# Patient Record
Sex: MALE | Race: WHITE | NOT HISPANIC OR LATINO | Employment: OTHER | URBAN - METROPOLITAN AREA
[De-identification: names, ages, dates, MRNs, and addresses within clinical notes are randomized per-mention and may not be internally consistent; named-entity substitution may affect disease eponyms.]

---

## 2021-10-08 ENCOUNTER — OFFICE VISIT (OUTPATIENT)
Dept: OPHTHALMOLOGY | Facility: CLINIC | Age: 86
End: 2021-10-08
Payer: COMMERCIAL

## 2021-10-08 DIAGNOSIS — H52.4 PRESBYOPIA: ICD-10-CM

## 2021-10-08 DIAGNOSIS — Z01.01 ENCOUNTER FOR EXAMINATION OF EYES AND VISION WITH ABNORMAL FINDINGS: Primary | ICD-10-CM

## 2021-10-08 DIAGNOSIS — H40.053 BILATERAL OCULAR HYPERTENSION: ICD-10-CM

## 2021-10-08 DIAGNOSIS — H25.813 COMBINED FORMS OF AGE-RELATED CATARACT OF BOTH EYES: ICD-10-CM

## 2021-10-08 PROCEDURE — 92015 DETERMINE REFRACTIVE STATE: CPT | Performed by: OPHTHALMOLOGY

## 2021-10-08 PROCEDURE — 92004 COMPRE OPH EXAM NEW PT 1/>: CPT | Performed by: OPHTHALMOLOGY

## 2021-10-08 ASSESSMENT — REFRACTION_WEARINGRX
OS_SPHERE: +1.50
OS_CYLINDER: +2.50
SPECS_TYPE: PAL
OD_CYLINDER: +1.50
OD_AXIS: 020
OS_ADD: +3.00
OS_AXIS: 143
OD_SPHERE: +3.00
OD_ADD: +3.00

## 2021-10-08 ASSESSMENT — REFRACTION_MANIFEST
OD_CYLINDER: +1.50
OD_AXIS: 015
OS_CYLINDER: +2.50
OD_SPHERE: +2.50
OS_AXIS: 140
OS_SPHERE: +1.50

## 2021-10-08 ASSESSMENT — VISUAL ACUITY
CORRECTION_TYPE: GLASSES
OD_CC: 20/200
OS_CC+: +1
OS_CC: 20/30
METHOD: SNELLEN - LINEAR

## 2021-10-08 ASSESSMENT — TONOMETRY
OS_IOP_MMHG: 28
IOP_METHOD: ICARE
IOP_METHOD: APPLANATION
OS_IOP_MMHG: 26

## 2021-10-08 ASSESSMENT — CONF VISUAL FIELD
OS_NORMAL: 1
OD_INFERIOR_TEMPORAL_RESTRICTION: 3

## 2021-10-08 NOTE — PATIENT INSTRUCTIONS
Will obtain glaucoma OCT and retinal OCT today - I will contact you if any concerns.  We will contact you to schedule cataract surgery for your right eye sometime in the near future.  Danie Pate M.D.  [phone]

## 2021-10-08 NOTE — PROGRESS NOTES
Current Eye Medications:  None     Subjective:  Pt states he is here for cataract evaluation. Pt notes he has cloudy vision in his right eye that started getting worse in the last 5 months.  States left eye vision is good. Pt avoids night driving due to vision. Denies floaters and flashes.    Describes gradual decrease in vision right eye.  Last eye exam 1 year ago - no mention of elevated intraocular pressure.    RH.   GFT.  Tamsulosin.  Wife in assisted living.  L[place] son can drive.  PCP [place].      Objective:  See Ophthalmology Exam.       Assessment:  Visually significant cataract right eye > left eye.  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      ICD-10-CM    1. Encounter for examination of eyes and vision with abnormal findings  Z01.01    2. Presbyopia  H52.4    3. 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  We will contact you to schedule cataract surgery for your right eye sometime in the near future.  Danie Pate M.D.  [phone]    Plan M Ring +/- Trypan  Hopkins Visual Field at preop; recheck intraocular pressures, consider treatment left eye.

## 2021-10-08 NOTE — LETTER
10/8/2021         RE: Sal Duarte  [address]  [place] [address]        Dear Colleague,    Thank you for referring your patient, Sal Duarte, to the [facility]. Please see a copy of my visit note below.     Current Eye Medications:  None     Subjective:  Pt states he is here for cataract evaluation. Pt notes he has cloudy vision in his right eye that started getting worse in the last 5 months.  States left eye vision is good. Pt avoids night driving due to vision. Denies floaters and flashes.    Describes gradual decrease in vision right eye.  Last eye exam 1 year ago - no mention of elevated intraocular pressure.    RH.   GFT.  Tamsulosin.  Wife in assisted living.  Lives with son in [place] son can drive.  PCP [place].      Objective:  See Ophthalmology Exam.       Assessment:  Visually significant cataract right eye > left eye.  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      ICD-10-CM    1. Encounter for examination of eyes and vision with abnormal findings  Z01.01    2. Presbyopia  H52.4    3. 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  We will contact you to schedule cataract surgery for your right eye sometime in the near future.  Danie Pate M.D.  [phone]    Plan M Ring +/- Trypan  Hopkins Visual Field at preop; recheck intraocular pressures, consider treatment left eye.             Again, thank you for allowing me to participate in the care of your patient.        Sincerely,        Danie Pate MD

## 2021-10-17 PROBLEM — H40.053 BILATERAL OCULAR HYPERTENSION: Status: ACTIVE | Noted: 2021-10-17

## 2021-10-17 PROBLEM — H25.813 COMBINED FORMS OF AGE-RELATED CATARACT OF BOTH EYES: Status: ACTIVE | Noted: 2021-10-17

## 2021-10-17 ASSESSMENT — TONOMETRY: OD_IOP_MMHG: 21

## 2021-10-17 ASSESSMENT — CUP TO DISC RATIO: OS_RATIO: 0.6

## 2021-10-17 ASSESSMENT — SLIT LAMP EXAM - LIDS
COMMENTS: 2+ DERMATOCHALASIS
COMMENTS: 2+ DERMATOCHALASIS

## 2021-10-17 ASSESSMENT — EXTERNAL EXAM - LEFT EYE: OS_EXAM: MILD TO MOD BROW

## 2021-10-17 ASSESSMENT — EXTERNAL EXAM - RIGHT EYE: OD_EXAM: MILD TO MOD BROW

## 2021-10-28 ENCOUNTER — TELEPHONE (OUTPATIENT)
Dept: OPHTHALMOLOGY | Facility: CLINIC | Age: 86
End: 2021-10-28

## 2021-10-28 ENCOUNTER — TELEPHONE (OUTPATIENT)
Dept: OPHTHALMOLOGY | Facility: CLINIC | Age: 86
End: 2021-10-28
Payer: COMMERCIAL

## 2021-10-28 DIAGNOSIS — H25.813 COMBINED FORMS OF AGE-RELATED CATARACT OF BOTH EYES: Primary | ICD-10-CM

## 2021-10-28 NOTE — TELEPHONE ENCOUNTER
Type of surgery: Kelman phacoemulsification with intraocular lens implant,  right  Location of surgery:[facility]  Date and time of surgery: 12-6-21  Surgeon: Rio  Pre-Op Appt Date:   Post-Op Appt Date: Multiple appts   Packet sent out: Yes  Pre-cert/Authorization completed:    Date:

## 2021-11-12 ENCOUNTER — TELEPHONE (OUTPATIENT)
Dept: OPHTHALMOLOGY | Facility: CLINIC | Age: 86
End: 2021-11-12
Payer: COMMERCIAL

## 2021-11-12 DIAGNOSIS — H25.813 COMBINED FORMS OF AGE-RELATED CATARACT OF BOTH EYES: Primary | ICD-10-CM

## 2021-11-12 NOTE — TELEPHONE ENCOUNTER
Patient's son Geovani [phone] called to schedule surgery. Date on original paperwork for 12-6-21. Per Jennifer @ [facility] 12-6-21 is no longer available. Send message to Dr Pate for new date. Advised patient will receive call when we get a new date.

## 2021-11-12 NOTE — TELEPHONE ENCOUNTER
Reason for Call:  Other call back    Detailed comments: Patient's son called to schedule surgery. Original date on paperwork was for 12-6-21. 12-6-21 is no longer available per [facility]. Please call patient to discuss next available date.    Phone Number Patient can be reached at: Home number on file [phone] (home) or son cell [phone]    Best Time: any    Can we leave a detailed message on this number? YES    Call taken on 11/12/2021 at 12:14 PM by Katty Dolan

## 2021-11-18 NOTE — TELEPHONE ENCOUNTER
Type of surgery: Kelman phacoemulsification with intraocular lens implant  CPT 66961  Encounter for examination of eyes and vision with abnormal findings Z01.01    Presbyopia H52.4     Combined forms of age-related cataract, mod, of both eyes H25.813     Bilateral ocular hypertension H40.053    Location of surgery: [facility]  Date and time of surgery: 12-6-21  Surgeon: Ángela  Pre-Op Appt Date: TBD per patient at outside clinic- [facility]  Post-Op Appt Date: Multiple appts   Packet sent out: Yes  Pre-cert/Authorization completed: No prior auth required for [employer] per online list. [facility] ok  Date: 11/18/21    Annette Ahmadi  Prior Authorization Dept  [phone]

## 2021-11-30 ENCOUNTER — OFFICE VISIT (OUTPATIENT)
Dept: OPHTHALMOLOGY | Facility: CLINIC | Age: 86
End: 2021-11-30
Payer: COMMERCIAL

## 2021-11-30 DIAGNOSIS — H40.1131 PRIMARY OPEN ANGLE GLAUCOMA (POAG) OF BOTH EYES, MILD STAGE: ICD-10-CM

## 2021-11-30 DIAGNOSIS — H25.813 COMBINED FORMS OF AGE-RELATED CATARACT OF BOTH EYES: ICD-10-CM

## 2021-11-30 DIAGNOSIS — H40.003 GLAUCOMA SUSPECT OF BOTH EYES: ICD-10-CM

## 2021-11-30 PROCEDURE — 92136 OPHTHALMIC BIOMETRY: CPT | Mod: 26 | Performed by: OPHTHALMOLOGY

## 2021-11-30 PROCEDURE — 99213 OFFICE O/P EST LOW 20 MIN: CPT | Performed by: OPHTHALMOLOGY

## 2021-11-30 PROCEDURE — 92083 EXTENDED VISUAL FIELD XM: CPT | Performed by: OPHTHALMOLOGY

## 2021-11-30 RX ORDER — KETOROLAC TROMETHAMINE 5 MG/ML
1 SOLUTION OPHTHALMIC 3 TIMES DAILY
Qty: 5 ML | Refills: 0 | Status: SHIPPED | OUTPATIENT
Start: 2021-12-07

## 2021-11-30 RX ORDER — PREDNISOLONE ACETATE 10 MG/ML
1 SUSPENSION/ DROPS OPHTHALMIC 3 TIMES DAILY
Qty: 5 ML | Refills: 0 | Status: SHIPPED | OUTPATIENT
Start: 2021-12-07

## 2021-11-30 RX ORDER — LATANOPROST 50 UG/ML
1 SOLUTION/ DROPS OPHTHALMIC AT BEDTIME
Qty: 2.5 ML | Refills: 11 | Status: SHIPPED | OUTPATIENT
Start: 2021-11-30

## 2021-11-30 ASSESSMENT — SLIT LAMP EXAM - LIDS
COMMENTS: 2+ DERMATOCHALASIS
COMMENTS: 2+ DERMATOCHALASIS

## 2021-11-30 ASSESSMENT — VISUAL ACUITY
OS_CC: 20/50
OD_CC: 20/300
OS_CC+: -1
METHOD: SNELLEN - LINEAR
CORRECTION_TYPE: GLASSES

## 2021-11-30 ASSESSMENT — EXTERNAL EXAM - LEFT EYE: OS_EXAM: MILD TO MOD BROW

## 2021-11-30 ASSESSMENT — TONOMETRY
OS_IOP_MMHG: 27
OD_IOP_MMHG: 20
IOP_METHOD: APPLANATION

## 2021-11-30 ASSESSMENT — EXTERNAL EXAM - RIGHT EYE: OD_EXAM: MILD TO MOD BROW

## 2021-11-30 NOTE — LETTER
11/30/2021         RE: Sal Duarte  [address]  [place] [address]        Dear Colleague,    Thank you for referring your patient, Sal Duarte, to the [facility]. Please see a copy of my visit note below.     Current Eye Medications:       Subjective:  Patient here for pre-op cataract surgery, right eye, scheduled for 12-6-21.  History and Physical done 11-22-21.  Consent signed.   Patient is also here for a pressure check, and a visual field (started with left eye).  Patient complains of a foreign body sensation near his right medial canthus area today.     Objective:  See Ophthalmology Exam.       Assessment:  Visually significant cataract right eye.  Probable open angle glaucoma both eyes.      Plan:  Plan Kelman phacoemulsification/ posterior chamber lens right eye local standby.  Risks, benefits, complications, and alternatives discussed with patient including possibility of limitations from coexistent eye disease and loss of vision.  Target refraction and multifocal lens options discussed.  Patient understands and consents.  Will start Latanoprost drop both eyes at bedtime.  Danie Pate M.D.           Again, thank you for allowing me to participate in the care of your patient.        Sincerely,        Danie Pate MD

## 2021-11-30 NOTE — PATIENT INSTRUCTIONS
PRE-OP CATARACT INSTRUCTIONS    *You will be picking up 2 eye drop bottles from your pharmacy.  You will use these the day after surgery.    *Start Latanoprost: one drop both eyes at bedtime     *No solid food after midnight.    *Clear liquids: coffee (no cream), tea, water, and jello are OK before 5:45 A.M.  You may take your regular pills with this.    *If you are taking glaucoma drops, continue as usual.    Danie Pate M.D.  [phone]

## 2021-11-30 NOTE — PROGRESS NOTES
Current Eye Medications:       Subjective:  Patient here for pre-op cataract surgery, right eye, scheduled for 12-6-21.  History and Physical done 11-22-21.  Consent signed.   Patient is also here for a pressure check, and a visual field (started with left eye).  Patient complains of a foreign body sensation near his right medial canthus area today.     Objective:  See Ophthalmology Exam.       Assessment:  Visually significant cataract right eye.  Probable open angle glaucoma both eyes.      Plan:  Plan Kelman phacoemulsification/ posterior chamber lens right eye local standby.  Risks, benefits, complications, and alternatives discussed with patient including possibility of limitations from coexistent eye disease and loss of vision.  Target refraction and multifocal lens options discussed.  Patient understands and consents.  Will start Latanoprost drop both eyes at bedtime.  Danie Pate M.D.

## 2021-12-02 PROBLEM — H40.1191 PRIMARY OPEN-ANGLE GLAUCOMA (POAG), MILD STAGE: Status: ACTIVE | Noted: 2021-12-02

## 2021-12-07 ENCOUNTER — OFFICE VISIT (OUTPATIENT)
Dept: OPHTHALMOLOGY | Facility: CLINIC | Age: 86
End: 2021-12-07
Payer: COMMERCIAL

## 2021-12-07 DIAGNOSIS — Z96.1 PSEUDOPHAKIA: Primary | ICD-10-CM

## 2021-12-07 PROBLEM — H25.812 COMBINED FORMS OF AGE-RELATED CATARACT OF LEFT EYE: Status: ACTIVE | Noted: 2021-12-07

## 2021-12-07 PROCEDURE — 99024 POSTOP FOLLOW-UP VISIT: CPT | Performed by: OPHTHALMOLOGY

## 2021-12-07 ASSESSMENT — VISUAL ACUITY
OD_PH_SC+: +1
OS_SC: 20/80
OS_SC+: 1
OD_PH_SC: 20/100
OS_PH_SC: 20/40
OD_SC: 20/150
METHOD: SNELLEN - LINEAR
OS_PH_SC+: -2

## 2021-12-07 ASSESSMENT — SLIT LAMP EXAM - LIDS: COMMENTS: NORMAL

## 2021-12-07 ASSESSMENT — TONOMETRY
IOP_METHOD: APPLANATION
OD_IOP_MMHG: 28

## 2021-12-07 NOTE — PROGRESS NOTES
Current Eye Medications:  Xalatan at bedtime left eye     Subjective: here for PO1 right eye. Doing very well, no pain or headache at all.      Objective:  See Ophthalmology Exam.       Assessment: 1 day sp Kelsie phacoemulsification right eye doing well.      Plan:   See Patient Instructions.

## 2021-12-07 NOTE — PATIENT INSTRUCTIONS
POST-OP CATARACT INSTRUCTIONS    Use the following drops in the right eye:    Prednisolone (pink or white cap) 3 times a day  Ketorolac (gray cap) 3 times a day    ~Wait at least 5 minutes between drops.    ~Wear eye shield at night for one week.    ~Do not exert yourself to the point that you are red in the face for one week.    ~If your vision worsens, eye becomes increasingly red, or becomes painful, call [phone].    ~If you are taking glaucoma medications, continue as usual.    ~OK to resume aspirin and/or other blood thinners.    Danie Pate M.D.

## 2021-12-07 NOTE — LETTER
12/7/2021         RE: Sal Duarte  [address]  [place] [address]        Dear Colleague,    Thank you for referring your patient, Sal Duarte, to the [facility]. Please see a copy of my visit note below.     Current Eye Medications:  Xalatan at bedtime left eye     Subjective: here for PO1 right eye. Doing very well, no pain or headache at all.      Objective:  See Ophthalmology Exam.       Assessment: 1 day sp Kelsie phacoemulsification right eye doing well.      Plan:   See Patient Instructions.           Again, thank you for allowing me to participate in the care of your patient.        Sincerely,        Danie Pate MD

## 2021-12-14 ENCOUNTER — OFFICE VISIT (OUTPATIENT)
Dept: OPHTHALMOLOGY | Facility: CLINIC | Age: 86
End: 2021-12-14
Payer: COMMERCIAL

## 2021-12-14 DIAGNOSIS — Z96.1 PSEUDOPHAKIA: Primary | ICD-10-CM

## 2021-12-14 PROCEDURE — 99024 POSTOP FOLLOW-UP VISIT: CPT | Performed by: OPHTHALMOLOGY

## 2021-12-14 ASSESSMENT — VISUAL ACUITY
OD_PH_SC: 20/50
OD_SC: 20/70
OD_SC+: -1+1
METHOD: SNELLEN - LINEAR
OD_PH_SC+: -1

## 2021-12-14 ASSESSMENT — REFRACTION_MANIFEST
OD_SPHERE: +1.00
OD_CYLINDER: +1.00
OD_AXIS: 015

## 2021-12-14 ASSESSMENT — SLIT LAMP EXAM - LIDS: COMMENTS: NORMAL

## 2021-12-14 ASSESSMENT — TONOMETRY
IOP_METHOD: APPLANATION
OD_IOP_MMHG: 13

## 2021-12-14 NOTE — PATIENT INSTRUCTIONS
1)   Continue Acular (gray) and prednisolone (pink) three times daily until each is gone.    2)   Stop shield one week following surgery.    3)   No eye rubbing.    4)   Continue Latanoprost at bedtime both eyes.    5)   Return one month for final exam.     Danie Pate M.D.

## 2021-12-14 NOTE — LETTER
12/14/2021         RE: Sal Duarte  [address]  [place] [address]        Dear Colleague,    Thank you for referring your patient, Sal Duarte, to the [facility]. Please see a copy of my visit note below.     Current Eye Medications:  Prednisolone and Acular TID right eye and Xalatan at bedtime both eyes     Subjective:  Here for PO2 right eye-100 % better than it was before the surgery. Also hip is better he says     Objective:  See Ophthalmology Exam.       Assessment: Doing well 1 week status/post Kelman phacoemulsification/ posterior chamber lens.      Plan:   See Patient Instructions.           Again, thank you for allowing me to participate in the care of your patient.        Sincerely,        Danie Pate MD

## 2021-12-14 NOTE — PROGRESS NOTES
Current Eye Medications:  Prednisolone and Acular TID right eye and Xalatan at bedtime both eyes     Subjective:  Here for PO2 right eye-100 % better than it was before the surgery. Also hip is better he says     Objective:  See Ophthalmology Exam.       Assessment: Doing well 1 week status/post Kelman phacoemulsification/ posterior chamber lens.      Plan:   See Patient Instructions.

## 2022-01-01 ENCOUNTER — LAB REQUISITION (OUTPATIENT)
Dept: LAB | Facility: CLINIC | Age: 87
End: 2022-01-01
Payer: COMMERCIAL

## 2022-01-01 DIAGNOSIS — I10 ESSENTIAL (PRIMARY) HYPERTENSION: ICD-10-CM

## 2022-01-01 DIAGNOSIS — D64.9 ANEMIA, UNSPECIFIED: ICD-10-CM

## 2022-01-01 LAB
ALBUMIN SERPL BCG-MCNC: 4 G/DL (ref 3.5–5.2)
ALP SERPL-CCNC: 100 U/L (ref 40–129)
ALT SERPL W P-5'-P-CCNC: 10 U/L (ref 10–50)
ANION GAP SERPL CALCULATED.3IONS-SCNC: 12 MMOL/L (ref 7–15)
AST SERPL W P-5'-P-CCNC: 20 U/L (ref 10–50)
BASOPHILS # BLD AUTO: 0 10E3/UL (ref 0–0.2)
BASOPHILS NFR BLD AUTO: 1 %
BILIRUB SERPL-MCNC: 0.8 MG/DL
BUN SERPL-MCNC: 31.4 MG/DL (ref 8–23)
CALCIUM SERPL-MCNC: 9 MG/DL (ref 8.8–10.2)
CHLORIDE SERPL-SCNC: 108 MMOL/L (ref 98–107)
CREAT SERPL-MCNC: 3.17 MG/DL (ref 0.67–1.17)
DEPRECATED HCO3 PLAS-SCNC: 24 MMOL/L (ref 22–29)
EOSINOPHIL # BLD AUTO: 0.2 10E3/UL (ref 0–0.7)
EOSINOPHIL NFR BLD AUTO: 4 %
ERYTHROCYTE [DISTWIDTH] IN BLOOD BY AUTOMATED COUNT: 13.8 % (ref 10–15)
GFR SERPL CREATININE-BSD FRML MDRD: 18 ML/MIN/1.73M2
GLUCOSE SERPL-MCNC: 83 MG/DL (ref 70–99)
HCT VFR BLD AUTO: 38.3 % (ref 40–53)
HGB BLD-MCNC: 12 G/DL (ref 13.3–17.7)
IMM GRANULOCYTES # BLD: 0 10E3/UL
IMM GRANULOCYTES NFR BLD: 0 %
LYMPHOCYTES # BLD AUTO: 1.3 10E3/UL (ref 0.8–5.3)
LYMPHOCYTES NFR BLD AUTO: 27 %
MCH RBC QN AUTO: 32.6 PG (ref 26.5–33)
MCHC RBC AUTO-ENTMCNC: 31.3 G/DL (ref 31.5–36.5)
MCV RBC AUTO: 104 FL (ref 78–100)
MONOCYTES # BLD AUTO: 0.6 10E3/UL (ref 0–1.3)
MONOCYTES NFR BLD AUTO: 12 %
NEUTROPHILS # BLD AUTO: 2.5 10E3/UL (ref 1.6–8.3)
NEUTROPHILS NFR BLD AUTO: 56 %
NRBC # BLD AUTO: 0 10E3/UL
NRBC BLD AUTO-RTO: 0 /100
PLATELET # BLD AUTO: 157 10E3/UL (ref 150–450)
POTASSIUM SERPL-SCNC: 4.3 MMOL/L (ref 3.4–5.3)
PROT SERPL-MCNC: 6.6 G/DL (ref 6.4–8.3)
RBC # BLD AUTO: 3.68 10E6/UL (ref 4.4–5.9)
SODIUM SERPL-SCNC: 144 MMOL/L (ref 136–145)
WBC # BLD AUTO: 4.6 10E3/UL (ref 4–11)

## 2022-01-01 PROCEDURE — 36415 COLL VENOUS BLD VENIPUNCTURE: CPT | Performed by: NURSE PRACTITIONER

## 2022-01-01 PROCEDURE — P9603 ONE-WAY ALLOW PRORATED MILES: HCPCS | Mod: ORL | Performed by: NURSE PRACTITIONER

## 2022-01-01 PROCEDURE — 80051 ELECTROLYTE PANEL: CPT | Mod: ORL | Performed by: NURSE PRACTITIONER

## 2022-01-01 PROCEDURE — 85025 COMPLETE CBC W/AUTO DIFF WBC: CPT | Performed by: NURSE PRACTITIONER

## 2022-01-04 ENCOUNTER — OFFICE VISIT (OUTPATIENT)
Dept: OPHTHALMOLOGY | Facility: CLINIC | Age: 87
End: 2022-01-04
Payer: COMMERCIAL

## 2022-01-04 DIAGNOSIS — Z96.1 PSEUDOPHAKIA: Primary | ICD-10-CM

## 2022-01-04 PROCEDURE — 99024 POSTOP FOLLOW-UP VISIT: CPT | Performed by: OPHTHALMOLOGY

## 2022-01-04 ASSESSMENT — REFRACTION_MANIFEST
OS_AXIS: 140
OS_CYLINDER: +2.25
OD_SPHERE: +0.50
OS_ADD: +2.75
OD_CYLINDER: +1.00
OD_ADD: +2.75
OS_SPHERE: +1.50
OD_AXIS: 015

## 2022-01-04 ASSESSMENT — CUP TO DISC RATIO: OD_RATIO: 0.5

## 2022-01-04 ASSESSMENT — SLIT LAMP EXAM - LIDS
COMMENTS: 2+ DERMATOCHALASIS
COMMENTS: 2+ DERMATOCHALASIS

## 2022-01-04 ASSESSMENT — VISUAL ACUITY
OS_CC: 20/40
OD_PH_SC+: -2
OD_SC: 20/70
OS_CC+: -1
OD_PH_SC: 20/30
METHOD: SNELLEN - LINEAR
CORRECTION_TYPE: GLASSES

## 2022-01-04 ASSESSMENT — EXTERNAL EXAM - RIGHT EYE: OD_EXAM: NORMAL

## 2022-01-04 ASSESSMENT — EXTERNAL EXAM - LEFT EYE: OS_EXAM: MILD TO MOD BROW

## 2022-01-04 ASSESSMENT — TONOMETRY
OS_IOP_MMHG: 19
IOP_METHOD: APPLANATION
OD_IOP_MMHG: 13

## 2022-01-04 NOTE — LETTER
1/4/2022         RE: Sal Duarte  [address]  [place] [address]        Dear Colleague,    Thank you for referring your patient, Sal Duarte, to the [facility]. Please see a copy of my visit note below.     Current Eye Medications:  Prednisolone and ketorolac three times a day right eye. Latanoprost at bedtime both eyes, last took at midnight.     Subjective:  Final MR, KPE/IOL right eye 12/6/21. Vision is doing well both eyes. Much brighter right eye. No eye pain or discomfort in either eye. Patient is wondering what the next step for getting left eye done.      Objective:  See Ophthalmology Exam.       Assessment:  1 month status/post Kelman phacoemulsification/ posterior chamber lens right eye.      Plan:  Discussed cataract surgery is an option left eye with Dr. Chowdhury at anytime.  Will change glasses for now and observe as left eye is not causing significant problems.  Danie Pate M.D.  [phone]    See Patient Instructions.             Again, thank you for allowing me to participate in the care of your patient.        Sincerely,        Danie Pate MD

## 2022-01-04 NOTE — PROGRESS NOTES
Current Eye Medications:  Prednisolone and ketorolac three times a day right eye. Latanoprost at bedtime both eyes, last took at midnight.     Subjective:  Final MR, KPE/IOL right eye 12/6/21. Vision is doing well both eyes. Much brighter right eye. No eye pain or discomfort in either eye. Patient is wondering what the next step for getting left eye done.      Objective:  See Ophthalmology Exam.       Assessment:  1 month status/post Kelman phacoemulsification/ posterior chamber lens right eye.      Plan:  Discussed cataract surgery is an option left eye with Dr. Chowdhury at anytime.  Will change glasses for now and observe as left eye is not causing significant problems.  Danie Pate M.D.  [phone]    See Patient Instructions.

## 2022-01-04 NOTE — PATIENT INSTRUCTIONS
1)  Discontinue all drops, unless used prior to cataract surgery.    2)   Fill Rx for new glasses or drugstore readers.    3)  Continue Latanoprost at bedtime both eyes.    4)   Return to clinic in three months for a pressure check or earlier if problems should arise.     Danie Pate M.D.  [phone]

## 2022-05-15 ENCOUNTER — HEALTH MAINTENANCE LETTER (OUTPATIENT)
Age: 87
End: 2022-05-15

## 2022-09-10 ENCOUNTER — HEALTH MAINTENANCE LETTER (OUTPATIENT)
Age: 87
End: 2022-09-10

## 2022-10-13 ENCOUNTER — LAB REQUISITION (OUTPATIENT)
Dept: LAB | Facility: CLINIC | Age: 87
End: 2022-10-13
Payer: COMMERCIAL

## 2022-10-13 DIAGNOSIS — I10 ESSENTIAL (PRIMARY) HYPERTENSION: ICD-10-CM

## 2022-10-13 DIAGNOSIS — D64.9 ANEMIA, UNSPECIFIED: ICD-10-CM

## 2022-10-13 DIAGNOSIS — E03.9 HYPOTHYROIDISM, UNSPECIFIED: ICD-10-CM

## 2022-10-13 LAB
ALBUMIN UR-MCNC: 100 MG/DL
APPEARANCE UR: CLEAR
BILIRUB UR QL STRIP: NEGATIVE
COLOR UR AUTO: YELLOW
GLUCOSE UR STRIP-MCNC: 70 MG/DL
HGB UR QL STRIP: ABNORMAL
KETONES UR STRIP-MCNC: NEGATIVE MG/DL
LEUKOCYTE ESTERASE UR QL STRIP: ABNORMAL
MUCOUS THREADS #/AREA URNS LPF: PRESENT /LPF
NITRATE UR QL: NEGATIVE
PH UR STRIP: 6 [PH] (ref 5–7)
RBC URINE: 16 /HPF
SP GR UR STRIP: 1.02 (ref 1–1.03)
SQUAMOUS EPITHELIAL: 3 /HPF
UROBILINOGEN UR STRIP-MCNC: NORMAL MG/DL
WBC URINE: 7 /HPF

## 2022-10-13 PROCEDURE — 81001 URINALYSIS AUTO W/SCOPE: CPT | Performed by: NURSE PRACTITIONER

## 2022-10-13 PROCEDURE — 87086 URINE CULTURE/COLONY COUNT: CPT | Performed by: NURSE PRACTITIONER

## 2022-10-15 LAB — BACTERIA UR CULT: NORMAL

## 2022-10-17 LAB
ANION GAP SERPL CALCULATED.3IONS-SCNC: 14 MMOL/L (ref 7–15)
BUN SERPL-MCNC: 34.4 MG/DL (ref 8–23)
CALCIUM SERPL-MCNC: 9.6 MG/DL (ref 8.8–10.2)
CHLORIDE SERPL-SCNC: 102 MMOL/L (ref 98–107)
CREAT SERPL-MCNC: 3.92 MG/DL (ref 0.67–1.17)
DEPRECATED HCO3 PLAS-SCNC: 22 MMOL/L (ref 22–29)
ERYTHROCYTE [DISTWIDTH] IN BLOOD BY AUTOMATED COUNT: 13.6 % (ref 10–15)
GFR SERPL CREATININE-BSD FRML MDRD: 14 ML/MIN/1.73M2
GLUCOSE SERPL-MCNC: 89 MG/DL (ref 70–99)
HCT VFR BLD AUTO: 38.7 % (ref 40–53)
HGB BLD-MCNC: 12.6 G/DL (ref 13.3–17.7)
MCH RBC QN AUTO: 32.3 PG (ref 26.5–33)
MCHC RBC AUTO-ENTMCNC: 32.6 G/DL (ref 31.5–36.5)
MCV RBC AUTO: 99 FL (ref 78–100)
PLATELET # BLD AUTO: 192 10E3/UL (ref 150–450)
POTASSIUM SERPL-SCNC: 4.4 MMOL/L (ref 3.4–5.3)
RBC # BLD AUTO: 3.9 10E6/UL (ref 4.4–5.9)
SODIUM SERPL-SCNC: 138 MMOL/L (ref 136–145)
TSH SERPL DL<=0.005 MIU/L-ACNC: 2.3 UIU/ML (ref 0.3–4.2)
WBC # BLD AUTO: 4.9 10E3/UL (ref 4–11)

## 2022-10-17 PROCEDURE — P9603 ONE-WAY ALLOW PRORATED MILES: HCPCS | Performed by: NURSE PRACTITIONER

## 2022-10-17 PROCEDURE — 36415 COLL VENOUS BLD VENIPUNCTURE: CPT | Performed by: NURSE PRACTITIONER

## 2022-10-17 PROCEDURE — 82374 ASSAY BLOOD CARBON DIOXIDE: CPT | Performed by: NURSE PRACTITIONER

## 2022-10-17 PROCEDURE — 84443 ASSAY THYROID STIM HORMONE: CPT | Performed by: NURSE PRACTITIONER

## 2022-10-17 PROCEDURE — 85027 COMPLETE CBC AUTOMATED: CPT | Performed by: NURSE PRACTITIONER

## 2023-01-01 ENCOUNTER — HEALTH MAINTENANCE LETTER (OUTPATIENT)
Age: 88
End: 2023-01-01

## 2023-01-01 ENCOUNTER — LAB REQUISITION (OUTPATIENT)
Dept: LAB | Facility: CLINIC | Age: 88
End: 2023-01-01
Payer: COMMERCIAL

## 2023-01-01 DIAGNOSIS — D64.9 ANEMIA, UNSPECIFIED: ICD-10-CM

## 2023-01-01 DIAGNOSIS — I10 ESSENTIAL (PRIMARY) HYPERTENSION: ICD-10-CM

## 2023-01-01 LAB
ANION GAP SERPL CALCULATED.3IONS-SCNC: 15 MMOL/L (ref 7–15)
BASOPHILS # BLD AUTO: 0 10E3/UL (ref 0–0.2)
BASOPHILS NFR BLD AUTO: 1 %
BUN SERPL-MCNC: 25.8 MG/DL (ref 8–23)
CALCIUM SERPL-MCNC: 9.6 MG/DL (ref 8.8–10.2)
CHLORIDE SERPL-SCNC: 106 MMOL/L (ref 98–107)
CREAT SERPL-MCNC: 2.86 MG/DL (ref 0.67–1.17)
DEPRECATED HCO3 PLAS-SCNC: 23 MMOL/L (ref 22–29)
EOSINOPHIL # BLD AUTO: 0 10E3/UL (ref 0–0.7)
EOSINOPHIL NFR BLD AUTO: 1 %
ERYTHROCYTE [DISTWIDTH] IN BLOOD BY AUTOMATED COUNT: 13.7 % (ref 10–15)
GFR SERPL CREATININE-BSD FRML MDRD: 21 ML/MIN/1.73M2
GLUCOSE SERPL-MCNC: 133 MG/DL (ref 70–99)
HCT VFR BLD AUTO: 41.5 % (ref 40–53)
HGB BLD-MCNC: 13.2 G/DL (ref 13.3–17.7)
IMM GRANULOCYTES # BLD: 0 10E3/UL
IMM GRANULOCYTES NFR BLD: 0 %
LYMPHOCYTES # BLD AUTO: 1.5 10E3/UL (ref 0.8–5.3)
LYMPHOCYTES NFR BLD AUTO: 26 %
MCH RBC QN AUTO: 33 PG (ref 26.5–33)
MCHC RBC AUTO-ENTMCNC: 31.8 G/DL (ref 31.5–36.5)
MCV RBC AUTO: 104 FL (ref 78–100)
MONOCYTES # BLD AUTO: 0.6 10E3/UL (ref 0–1.3)
MONOCYTES NFR BLD AUTO: 11 %
NEUTROPHILS # BLD AUTO: 3.5 10E3/UL (ref 1.6–8.3)
NEUTROPHILS NFR BLD AUTO: 61 %
NRBC # BLD AUTO: 0 10E3/UL
NRBC BLD AUTO-RTO: 0 /100
PLATELET # BLD AUTO: 168 10E3/UL (ref 150–450)
POTASSIUM SERPL-SCNC: 3.7 MMOL/L (ref 3.4–5.3)
RBC # BLD AUTO: 4 10E6/UL (ref 4.4–5.9)
SODIUM SERPL-SCNC: 144 MMOL/L (ref 136–145)
WBC # BLD AUTO: 5.6 10E3/UL (ref 4–11)

## 2023-01-01 PROCEDURE — 80048 BASIC METABOLIC PNL TOTAL CA: CPT | Mod: ORL | Performed by: NURSE PRACTITIONER

## 2023-01-01 PROCEDURE — 36415 COLL VENOUS BLD VENIPUNCTURE: CPT | Mod: ORL | Performed by: NURSE PRACTITIONER

## 2023-01-01 PROCEDURE — P9603 ONE-WAY ALLOW PRORATED MILES: HCPCS | Mod: ORL | Performed by: NURSE PRACTITIONER

## 2023-01-01 PROCEDURE — 85025 COMPLETE CBC W/AUTO DIFF WBC: CPT | Mod: ORL | Performed by: NURSE PRACTITIONER
